# Patient Record
Sex: FEMALE | Race: WHITE | NOT HISPANIC OR LATINO | RURAL
[De-identification: names, ages, dates, MRNs, and addresses within clinical notes are randomized per-mention and may not be internally consistent; named-entity substitution may affect disease eponyms.]

---

## 2022-01-12 ENCOUNTER — OFFICE VISIT (OUTPATIENT)
Dept: RURAL CLINIC 8 | Facility: CLINIC | Age: 67
End: 2022-01-12
Payer: COMMERCIAL

## 2022-01-12 ENCOUNTER — CLAIMS CREATED FROM THE CLAIM WINDOW (OUTPATIENT)
Dept: RURAL CLINIC 8 | Facility: CLINIC | Age: 67
End: 2022-01-12
Payer: COMMERCIAL

## 2022-01-12 DIAGNOSIS — M43.6 BENIGN TORTICOLLIS: ICD-10-CM

## 2022-01-12 DIAGNOSIS — Z86.010 HISTORY OF COLONIC POLYPS: ICD-10-CM

## 2022-01-12 DIAGNOSIS — J44.9 CHRONIC OBSTRUCTIVE PULMONARY DISEASE, UNSPECIFIED COPD TYPE: ICD-10-CM

## 2022-01-12 DIAGNOSIS — K21.9 GERD WITHOUT ESOPHAGITIS: ICD-10-CM

## 2022-01-12 DIAGNOSIS — R13.19 ESOPHAGEAL DYSPHAGIA: ICD-10-CM

## 2022-01-12 DIAGNOSIS — Z72.0 TOBACCO ABUSE: ICD-10-CM

## 2022-01-12 PROBLEM — 13645005: Status: ACTIVE | Noted: 2022-01-12

## 2022-01-12 PROCEDURE — 99204 OFFICE O/P NEW MOD 45 MIN: CPT | Performed by: INTERNAL MEDICINE

## 2022-01-12 RX ORDER — CETIRIZINE HYDROCHLORIDE 1 MG/ML
AS DIRECTED SOLUTION ORAL
Status: ACTIVE | COMMUNITY

## 2022-01-12 RX ORDER — OMADACYCLINE 150 MG/1
2 TABLETS TABLET, FILM COATED ORAL ONCE A DAY
Status: DISCONTINUED | COMMUNITY

## 2022-01-12 RX ORDER — BUPROPION HYDROCHLORIDE 150 MG/1
1 TABLET IN THE MORNING TABLET, EXTENDED RELEASE ORAL ONCE A DAY
Status: DISCONTINUED | COMMUNITY

## 2022-01-12 RX ORDER — SODIUM, POTASSIUM,MAG SULFATES 17.5-3.13G
177 ML SOLUTION, RECONSTITUTED, ORAL ORAL
Qty: 1 KIT | Refills: 0 | OUTPATIENT
Start: 2022-01-12

## 2022-01-12 RX ORDER — FAMOTIDINE 40 MG/1
1 TABLET AT BEDTIME TABLET, FILM COATED ORAL ONCE A DAY
Status: ACTIVE | COMMUNITY

## 2022-01-12 RX ORDER — BISACODYL 5 MG
TAKE 4 TABLET, DELAYED RELEASE (ENTERIC COATED) ORAL
Qty: 4 | OUTPATIENT
Start: 2022-01-12 | End: 2022-01-13

## 2022-01-12 RX ORDER — BUDESONIDE AND FORMOTEROL FUMARATE DIHYDRATE 160; 4.5 UG/1; UG/1
2 PUFFS AEROSOL RESPIRATORY (INHALATION) TWICE A DAY
Status: ACTIVE | COMMUNITY

## 2022-01-12 RX ORDER — ESOMEPRAZOLE MAGNESIUM 40 MG/1
1 PACKET MIXED WITH 15 ML OF WATER FOR SUSPENSION ORAL ONCE A DAY
Qty: 90 | Refills: 3 | OUTPATIENT
Start: 2022-01-12

## 2022-01-12 RX ORDER — BUPRENORPHINE HYDROCHLORIDE, NALOXONE HYDROCHLORIDE 8; 2 MG/1; MG/1
1 FILM UNDER THE TONGUE AND ALLOW TO DISSOLVE FILM, SOLUBLE BUCCAL; SUBLINGUAL ONCE A DAY
Status: ACTIVE | COMMUNITY

## 2022-01-12 RX ORDER — ALBUTEROL SULFATE 90 UG/1
1 PUFF AS NEEDED AEROSOL, METERED RESPIRATORY (INHALATION)
Status: ACTIVE | COMMUNITY

## 2022-01-12 RX ORDER — FAMOTIDINE 20 MG/1
1 TABLET AT BEDTIME AS NEEDED TABLET, FILM COATED ORAL ONCE A DAY
Status: ACTIVE | COMMUNITY

## 2022-01-12 RX ORDER — LOSARTAN POTASSIUM 50 MG/1
1 TABLET TABLET ORAL ONCE A DAY
Status: ACTIVE | COMMUNITY

## 2022-01-12 NOTE — HPI-TODAY'S VISIT:
Pt comes on referral from Dr. Lakesha Escalona. a copy will be sent to the referring MD. Pt says that this problem started about 5 years ago. she started to have problems swallowing. food will just go part of the way down and hang up. she would have to wash it down and keep on eating. it can hang up and stay there all day long. - it feels like liquids go down just fine but solid foods hang up. it feels like it get stuck about midways down.she will have to jjst drink water til it all goes down. - she says that she has lost 65 pounds in the last year. she thinks its b/c she cannot eat enough. she apparently had a swallow evatuaion radiographically but of course it was not sent to me to review.

## 2022-01-18 ENCOUNTER — TELEPHONE ENCOUNTER (OUTPATIENT)
Dept: URBAN - METROPOLITAN AREA CLINIC 92 | Facility: CLINIC | Age: 67
End: 2022-01-18

## 2022-01-21 ENCOUNTER — TELEPHONE ENCOUNTER (OUTPATIENT)
Dept: URBAN - METROPOLITAN AREA CLINIC 92 | Facility: CLINIC | Age: 67
End: 2022-01-21

## 2022-02-25 ENCOUNTER — OFFICE VISIT (OUTPATIENT)
Dept: RURAL MEDICAL CENTER 4 | Facility: MEDICAL CENTER | Age: 67
End: 2022-02-25
Payer: COMMERCIAL

## 2022-02-25 DIAGNOSIS — Z86.010 ADENOMAS PERSONAL HISTORY OF COLONIC POLYPS: ICD-10-CM

## 2022-02-25 DIAGNOSIS — K29.40 ATROPHIC GASTRITIS: ICD-10-CM

## 2022-02-25 DIAGNOSIS — K31.89 ACQUIRED DEFORMITY OF DUODENUM: ICD-10-CM

## 2022-02-25 DIAGNOSIS — K22.89 ESOPHAGEAL BLEED, NON-VARICEAL: ICD-10-CM

## 2022-02-25 DIAGNOSIS — Z53.8 FAILED ATTEMPTED SURGICAL PROCEDURE: ICD-10-CM

## 2022-02-25 DIAGNOSIS — R13.19 CERVICAL DYSPHAGIA: ICD-10-CM

## 2022-02-25 PROCEDURE — G0105 COLORECTAL SCRN; HI RISK IND: HCPCS | Performed by: INTERNAL MEDICINE

## 2022-02-25 PROCEDURE — 43239 EGD BIOPSY SINGLE/MULTIPLE: CPT | Performed by: INTERNAL MEDICINE

## 2022-02-25 PROCEDURE — 43249 ESOPH EGD DILATION <30 MM: CPT | Performed by: INTERNAL MEDICINE

## 2022-04-08 ENCOUNTER — TELEPHONE ENCOUNTER (OUTPATIENT)
Dept: URBAN - METROPOLITAN AREA CLINIC 92 | Facility: CLINIC | Age: 67
End: 2022-04-08

## 2022-06-15 ENCOUNTER — LAB OUTSIDE AN ENCOUNTER (OUTPATIENT)
Dept: RURAL CLINIC 8 | Facility: CLINIC | Age: 67
End: 2022-06-15

## 2022-06-15 PROBLEM — 428283002: Status: ACTIVE | Noted: 2022-01-12

## 2022-06-20 ENCOUNTER — TELEPHONE ENCOUNTER (OUTPATIENT)
Dept: URBAN - METROPOLITAN AREA CLINIC 105 | Facility: CLINIC | Age: 67
End: 2022-06-20

## 2022-06-21 ENCOUNTER — OFFICE VISIT (OUTPATIENT)
Dept: RURAL MEDICAL CENTER 4 | Facility: MEDICAL CENTER | Age: 67
End: 2022-06-21

## 2022-06-22 ENCOUNTER — OFFICE VISIT (OUTPATIENT)
Dept: RURAL CLINIC 8 | Facility: CLINIC | Age: 67
End: 2022-06-22
Payer: COMMERCIAL

## 2022-06-22 VITALS
HEART RATE: 85 BPM | HEIGHT: 62 IN | SYSTOLIC BLOOD PRESSURE: 132 MMHG | TEMPERATURE: 98.4 F | WEIGHT: 150 LBS | BODY MASS INDEX: 27.6 KG/M2 | DIASTOLIC BLOOD PRESSURE: 62 MMHG

## 2022-06-22 DIAGNOSIS — R10.13 EPIGASTRIC ABDOMINAL PAIN: ICD-10-CM

## 2022-06-22 DIAGNOSIS — R11.14 BILIOUS VOMITING WITH NAUSEA: ICD-10-CM

## 2022-06-22 DIAGNOSIS — R93.5 ABNORMAL CT OF THE ABDOMEN: ICD-10-CM

## 2022-06-22 DIAGNOSIS — K59.09 CHRONIC CONSTIPATION: ICD-10-CM

## 2022-06-22 DIAGNOSIS — K21.9 GERD WITHOUT ESOPHAGITIS: ICD-10-CM

## 2022-06-22 PROCEDURE — 99214 OFFICE O/P EST MOD 30 MIN: CPT | Performed by: INTERNAL MEDICINE

## 2022-06-22 RX ORDER — HYOSCYAMINE SULFATE 0.12 MG/1
1 TABLET TABLET SUBLINGUAL
Qty: 60 | Refills: 1 | OUTPATIENT
Start: 2022-06-22 | End: 2022-08-21

## 2022-06-22 RX ORDER — LOSARTAN POTASSIUM 50 MG/1
1 TABLET TABLET ORAL ONCE A DAY
Status: ACTIVE | COMMUNITY

## 2022-06-22 RX ORDER — FAMOTIDINE 40 MG/1
1 TABLET AT BEDTIME TABLET, FILM COATED ORAL ONCE A DAY
Status: ACTIVE | COMMUNITY

## 2022-06-22 RX ORDER — ESOMEPRAZOLE MAGNESIUM 40 MG/1
1 PACKET MIXED WITH 15 ML OF WATER FOR SUSPENSION ORAL ONCE A DAY
Qty: 90 | Refills: 3 | Status: DISCONTINUED | COMMUNITY
Start: 2022-01-12

## 2022-06-22 RX ORDER — BUDESONIDE AND FORMOTEROL FUMARATE DIHYDRATE 160; 4.5 UG/1; UG/1
2 PUFFS AEROSOL RESPIRATORY (INHALATION) TWICE A DAY
Status: ACTIVE | COMMUNITY

## 2022-06-22 RX ORDER — ALBUTEROL SULFATE 90 UG/1
1 PUFF AS NEEDED AEROSOL, METERED RESPIRATORY (INHALATION)
Status: ACTIVE | COMMUNITY

## 2022-06-22 RX ORDER — SODIUM, POTASSIUM,MAG SULFATES 17.5-3.13G
177 ML SOLUTION, RECONSTITUTED, ORAL ORAL
Qty: 1 KIT | Refills: 0 | Status: DISCONTINUED | COMMUNITY
Start: 2022-01-12

## 2022-06-22 RX ORDER — BUPRENORPHINE HYDROCHLORIDE, NALOXONE HYDROCHLORIDE 8; 2 MG/1; MG/1
1 FILM UNDER THE TONGUE AND ALLOW TO DISSOLVE FILM, SOLUBLE BUCCAL; SUBLINGUAL ONCE A DAY
Status: ACTIVE | COMMUNITY

## 2022-06-22 RX ORDER — FAMOTIDINE 20 MG/1
1 TABLET AT BEDTIME AS NEEDED TABLET, FILM COATED ORAL ONCE A DAY
Status: ACTIVE | COMMUNITY

## 2022-06-22 RX ORDER — CETIRIZINE HYDROCHLORIDE 1 MG/ML
AS DIRECTED SOLUTION ORAL
Status: ACTIVE | COMMUNITY

## 2022-06-22 NOTE — HPI-TODAY'S VISIT:
Pt comes on referral from Dr. Lakesha Escalona. a copy will be sent to the referring MD. Pt says that this problem started about 5 years ago. she started to have problems swallowing. food will just go part of the way down and hang up. she would have to wash it down and keep on eating. it can hang up and stay there all day long. - it feels like liquids go down just fine but solid foods hang up. it feels like it get stuck about midways down.she will have to jjst drink water til it all goes down. - she says that she has lost 65 pounds in the last year. she thinks its b/c she cannot eat enough. she apparently had a swallow evatuaion radiographically but of course it was not sent to me to review. -   06/22/2022: She was on the schedule for Repeat colonoscopy yesterday but she did not come to the appointment.  Recent CT scan showed some chronic thickening of the sigmoid colon. -   In February 2022 I performed this patient's EGD secondary to dysphagia or and I empirically dilated the esophagus.  Antral biopsies were negative for Helicobacter.  The colonoscopy was aborted secondary to formed stool in the colon. -  she is only on famotidine once a day and still having acid reflux.  She says that she get spontaneous regurgitation of acid.  She also says that sometimes food does not want to go down all the way.  She has only been able to eat liquid things.  She was unable to keep the prep down in order to have her colonoscopy performed.

## 2022-08-15 ENCOUNTER — TELEPHONE ENCOUNTER (OUTPATIENT)
Dept: URBAN - METROPOLITAN AREA CLINIC 92 | Facility: CLINIC | Age: 67
End: 2022-08-15

## 2022-09-13 ENCOUNTER — TELEPHONE ENCOUNTER (OUTPATIENT)
Dept: URBAN - METROPOLITAN AREA CLINIC 105 | Facility: CLINIC | Age: 67
End: 2022-09-13

## 2022-09-27 ENCOUNTER — TELEPHONE ENCOUNTER (OUTPATIENT)
Dept: URBAN - METROPOLITAN AREA CLINIC 92 | Facility: CLINIC | Age: 67
End: 2022-09-27

## 2022-10-12 ENCOUNTER — OFFICE VISIT (OUTPATIENT)
Dept: RURAL CLINIC 8 | Facility: CLINIC | Age: 67
End: 2022-10-12
Payer: COMMERCIAL

## 2022-10-12 ENCOUNTER — TELEPHONE ENCOUNTER (OUTPATIENT)
Dept: URBAN - METROPOLITAN AREA CLINIC 92 | Facility: CLINIC | Age: 67
End: 2022-10-12

## 2022-10-12 VITALS
WEIGHT: 150 LBS | SYSTOLIC BLOOD PRESSURE: 142 MMHG | HEIGHT: 62 IN | HEART RATE: 79 BPM | DIASTOLIC BLOOD PRESSURE: 66 MMHG | TEMPERATURE: 98.4 F | BODY MASS INDEX: 27.6 KG/M2

## 2022-10-12 DIAGNOSIS — K21.9 GERD WITHOUT ESOPHAGITIS: ICD-10-CM

## 2022-10-12 DIAGNOSIS — K59.09 CHRONIC CONSTIPATION: ICD-10-CM

## 2022-10-12 DIAGNOSIS — M54.9 DORSALGIA, UNSPECIFIED: ICD-10-CM

## 2022-10-12 DIAGNOSIS — G89.29 OTHER CHRONIC PAIN: ICD-10-CM

## 2022-10-12 DIAGNOSIS — R12 HEARTBURN: ICD-10-CM

## 2022-10-12 PROBLEM — 266435005: Status: ACTIVE | Noted: 2022-01-12

## 2022-10-12 PROBLEM — 82423001: Status: ACTIVE | Noted: 2022-10-12

## 2022-10-12 PROCEDURE — 99214 OFFICE O/P EST MOD 30 MIN: CPT | Performed by: INTERNAL MEDICINE

## 2022-10-12 RX ORDER — ALBUTEROL SULFATE 90 UG/1
1 PUFF AS NEEDED AEROSOL, METERED RESPIRATORY (INHALATION)
Status: ACTIVE | COMMUNITY

## 2022-10-12 RX ORDER — LOSARTAN POTASSIUM 50 MG/1
1 TABLET TABLET ORAL ONCE A DAY
Status: ACTIVE | COMMUNITY

## 2022-10-12 RX ORDER — BUPRENORPHINE HYDROCHLORIDE, NALOXONE HYDROCHLORIDE 8; 2 MG/1; MG/1
1 FILM UNDER THE TONGUE AND ALLOW TO DISSOLVE FILM, SOLUBLE BUCCAL; SUBLINGUAL ONCE A DAY
Status: ACTIVE | COMMUNITY

## 2022-10-12 RX ORDER — FAMOTIDINE 40 MG/1
1 TABLET AT BEDTIME TABLET, FILM COATED ORAL ONCE A DAY
Status: ACTIVE | COMMUNITY

## 2022-10-12 RX ORDER — FAMOTIDINE 20 MG/1
1 TABLET AT BEDTIME AS NEEDED TABLET, FILM COATED ORAL ONCE A DAY
Status: ACTIVE | COMMUNITY

## 2022-10-12 RX ORDER — NALOXEGOL OXALATE 25 MG/1
1 TABLET IN THE MORNING TABLET, FILM COATED ORAL ONCE A DAY
Qty: 30 | OUTPATIENT
Start: 2022-10-12 | End: 2022-11-10

## 2022-10-12 RX ORDER — CETIRIZINE HYDROCHLORIDE 1 MG/ML
AS DIRECTED SOLUTION ORAL
Status: ACTIVE | COMMUNITY

## 2022-10-12 RX ORDER — PANTOPRAZOLE SODIUM 40 MG/1
1 TABLET TABLET, DELAYED RELEASE ORAL ONCE A DAY
Qty: 90 TABLET | Refills: 3 | OUTPATIENT
Start: 2022-10-12

## 2022-10-12 RX ORDER — BUDESONIDE AND FORMOTEROL FUMARATE DIHYDRATE 160; 4.5 UG/1; UG/1
2 PUFFS AEROSOL RESPIRATORY (INHALATION) TWICE A DAY
Status: ACTIVE | COMMUNITY

## 2022-10-12 NOTE — HPI-TODAY'S VISIT:
Pt comes on referral from Dr. Lakesha Escalona. a copy will be sent to the referring MD. Pt says that this problem started about 5 years ago. she started to have problems swallowing. food will just go part of the way down and hang up. she would have to wash it down and keep on eating. it can hang up and stay there all day long. - it feels like liquids go down just fine but solid foods hang up. it feels like it get stuck about midways down.she will have to jjst drink water til it all goes down. - she says that she has lost 65 pounds in the last year. she thinks its b/c she cannot eat enough. she apparently had a swallow evatuaion radiographically but of course it was not sent to me to review. -   06/22/2022: She was on the schedule for Repeat colonoscopy yesterday but she did not come to the appointment.  Recent CT scan showed some chronic thickening of the sigmoid colon. -   In February 2022 I performed this patient's EGD secondary to dysphagia or and I empirically dilated the esophagus.  Antral biopsies were negative for Helicobacter.  The colonoscopy was aborted secondary to formed stool in the colon. -  she is only on famotidine once a day and still having acid reflux.  She says that she get spontaneous regurgitation of acid.  She also says that sometimes food does not want to go down all the way.  She has only been able to eat liquid things.  She was unable to keep the prep down in order to have her colonoscopy performed. -  10/12/2022: At her last visit I requested an upper GI series that did show acid reflux.  There was slight delay in the contrast passing through the small bowel.  Patient could not hold down the prep to re-attempt her colonoscopy after the 1 in February had to be aborted due to her poor bowel prep.  We also gave her some Levsin last time to try.  She says that she is having bad acid reflux and she is bed constipated.  Takes Suboxone 3 times a day.  She is having lower cramping pain in the pelvis and lower abdomen.

## 2022-12-20 ENCOUNTER — TELEPHONE ENCOUNTER (OUTPATIENT)
Dept: URBAN - METROPOLITAN AREA CLINIC 92 | Facility: CLINIC | Age: 67
End: 2022-12-20

## 2022-12-27 ENCOUNTER — OFFICE VISIT (OUTPATIENT)
Dept: RURAL CLINIC 4 | Facility: CLINIC | Age: 67
End: 2022-12-27

## 2022-12-27 RX ORDER — BUPRENORPHINE HYDROCHLORIDE, NALOXONE HYDROCHLORIDE 8; 2 MG/1; MG/1
1 FILM UNDER THE TONGUE AND ALLOW TO DISSOLVE FILM, SOLUBLE BUCCAL; SUBLINGUAL ONCE A DAY
Status: ACTIVE | COMMUNITY

## 2022-12-27 RX ORDER — FAMOTIDINE 20 MG/1
1 TABLET AT BEDTIME AS NEEDED TABLET, FILM COATED ORAL ONCE A DAY
Status: ACTIVE | COMMUNITY

## 2022-12-27 RX ORDER — PANTOPRAZOLE SODIUM 40 MG/1
1 TABLET TABLET, DELAYED RELEASE ORAL ONCE A DAY
Qty: 90 TABLET | Refills: 3 | Status: ACTIVE | COMMUNITY
Start: 2022-10-12

## 2022-12-27 RX ORDER — BUDESONIDE AND FORMOTEROL FUMARATE DIHYDRATE 160; 4.5 UG/1; UG/1
2 PUFFS AEROSOL RESPIRATORY (INHALATION) TWICE A DAY
Status: ACTIVE | COMMUNITY

## 2022-12-27 RX ORDER — FAMOTIDINE 40 MG/1
1 TABLET AT BEDTIME TABLET, FILM COATED ORAL ONCE A DAY
Status: ACTIVE | COMMUNITY

## 2022-12-27 RX ORDER — LOSARTAN POTASSIUM 50 MG/1
1 TABLET TABLET ORAL ONCE A DAY
Status: ACTIVE | COMMUNITY

## 2022-12-27 RX ORDER — CETIRIZINE HYDROCHLORIDE 1 MG/ML
AS DIRECTED SOLUTION ORAL
Status: ACTIVE | COMMUNITY

## 2022-12-27 RX ORDER — ALBUTEROL SULFATE 90 UG/1
1 PUFF AS NEEDED AEROSOL, METERED RESPIRATORY (INHALATION)
Status: ACTIVE | COMMUNITY

## 2023-01-05 ENCOUNTER — OFFICE VISIT (OUTPATIENT)
Dept: RURAL CLINIC 4 | Facility: CLINIC | Age: 68
End: 2023-01-05

## 2023-01-05 RX ORDER — FAMOTIDINE 20 MG/1
1 TABLET AT BEDTIME AS NEEDED TABLET, FILM COATED ORAL ONCE A DAY
COMMUNITY

## 2023-01-05 RX ORDER — BUPRENORPHINE HYDROCHLORIDE, NALOXONE HYDROCHLORIDE 8; 2 MG/1; MG/1
1 FILM UNDER THE TONGUE AND ALLOW TO DISSOLVE FILM, SOLUBLE BUCCAL; SUBLINGUAL ONCE A DAY
COMMUNITY

## 2023-01-05 RX ORDER — CETIRIZINE HYDROCHLORIDE 1 MG/ML
AS DIRECTED SOLUTION ORAL
COMMUNITY

## 2023-01-05 RX ORDER — ALBUTEROL SULFATE 90 UG/1
1 PUFF AS NEEDED AEROSOL, METERED RESPIRATORY (INHALATION)
COMMUNITY

## 2023-01-05 RX ORDER — BUDESONIDE AND FORMOTEROL FUMARATE DIHYDRATE 160; 4.5 UG/1; UG/1
2 PUFFS AEROSOL RESPIRATORY (INHALATION) TWICE A DAY
COMMUNITY

## 2023-01-05 RX ORDER — PANTOPRAZOLE SODIUM 40 MG/1
1 TABLET TABLET, DELAYED RELEASE ORAL ONCE A DAY
Qty: 90 TABLET | Refills: 3 | COMMUNITY
Start: 2022-10-12

## 2023-01-05 RX ORDER — FAMOTIDINE 40 MG/1
1 TABLET AT BEDTIME TABLET, FILM COATED ORAL ONCE A DAY
COMMUNITY

## 2023-01-05 RX ORDER — LOSARTAN POTASSIUM 50 MG/1
1 TABLET TABLET ORAL ONCE A DAY
COMMUNITY

## 2023-01-12 ENCOUNTER — CLAIMS CREATED FROM THE CLAIM WINDOW (OUTPATIENT)
Dept: RURAL CLINIC 4 | Facility: CLINIC | Age: 68
End: 2023-01-12
Payer: COMMERCIAL

## 2023-01-12 ENCOUNTER — OFFICE VISIT (OUTPATIENT)
Dept: RURAL CLINIC 4 | Facility: CLINIC | Age: 68
End: 2023-01-12
Payer: COMMERCIAL

## 2023-01-12 VITALS
TEMPERATURE: 98 F | DIASTOLIC BLOOD PRESSURE: 79 MMHG | BODY MASS INDEX: 27.6 KG/M2 | SYSTOLIC BLOOD PRESSURE: 152 MMHG | HEIGHT: 62 IN | HEART RATE: 85 BPM | WEIGHT: 150 LBS

## 2023-01-12 DIAGNOSIS — R10.32 LEFT LOWER QUADRANT PAIN: ICD-10-CM

## 2023-01-12 DIAGNOSIS — R10.31 RIGHT LOWER QUADRANT PAIN: ICD-10-CM

## 2023-01-12 DIAGNOSIS — K59.04 CHRONIC IDIOPATHIC CONSTIPATION: ICD-10-CM

## 2023-01-12 DIAGNOSIS — Z86.010 HISTORY OF COLON POLYPS: ICD-10-CM

## 2023-01-12 PROBLEM — 54586004: Status: ACTIVE | Noted: 2023-01-12

## 2023-01-12 PROBLEM — 301716002: Status: ACTIVE | Noted: 2023-01-12

## 2023-01-12 PROBLEM — 82934008: Status: ACTIVE | Noted: 2023-01-12

## 2023-01-12 PROBLEM — 116289008: Status: ACTIVE | Noted: 2023-01-12

## 2023-01-12 PROBLEM — 266433003: Status: ACTIVE | Noted: 2023-01-12

## 2023-01-12 PROBLEM — 62315008: Status: ACTIVE | Noted: 2023-01-12

## 2023-01-12 PROCEDURE — 99214 OFFICE O/P EST MOD 30 MIN: CPT | Performed by: INTERNAL MEDICINE

## 2023-01-12 RX ORDER — PANTOPRAZOLE SODIUM 40 MG/1
1 TABLET TABLET, DELAYED RELEASE ORAL ONCE A DAY
Qty: 90 TABLET | Refills: 3 | Status: DISCONTINUED | COMMUNITY
Start: 2022-10-12

## 2023-01-12 RX ORDER — CETIRIZINE HYDROCHLORIDE 1 MG/ML
AS DIRECTED SOLUTION ORAL
Status: DISCONTINUED | COMMUNITY

## 2023-01-12 RX ORDER — FAMOTIDINE 20 MG/1
1 TABLET AT BEDTIME AS NEEDED TABLET, FILM COATED ORAL ONCE A DAY
Status: ACTIVE | COMMUNITY

## 2023-01-12 RX ORDER — ALBUTEROL SULFATE 90 UG/1
1 PUFF AS NEEDED AEROSOL, METERED RESPIRATORY (INHALATION)
Status: ACTIVE | COMMUNITY

## 2023-01-12 RX ORDER — BUDESONIDE AND FORMOTEROL FUMARATE DIHYDRATE 160; 4.5 UG/1; UG/1
2 PUFFS AEROSOL RESPIRATORY (INHALATION) TWICE A DAY
Status: ACTIVE | COMMUNITY

## 2023-01-12 RX ORDER — BUPRENORPHINE HYDROCHLORIDE, NALOXONE HYDROCHLORIDE 8; 2 MG/1; MG/1
1 FILM UNDER THE TONGUE AND ALLOW TO DISSOLVE FILM, SOLUBLE BUCCAL; SUBLINGUAL ONCE A DAY
Status: ACTIVE | COMMUNITY

## 2023-01-12 RX ORDER — FAMOTIDINE 40 MG/1
1 TABLET AT BEDTIME TABLET, FILM COATED ORAL ONCE A DAY
Status: DISCONTINUED | COMMUNITY

## 2023-01-12 RX ORDER — LOSARTAN POTASSIUM 50 MG/1
1 TABLET TABLET ORAL ONCE A DAY
Status: ACTIVE | COMMUNITY

## 2023-01-12 RX ORDER — NALOXEGOL OXALATE 25 MG/1
1 TABLET IN THE MORNING TABLET, FILM COATED ORAL ONCE A DAY
Qty: 30 | Refills: 3 | OUTPATIENT
Start: 2023-01-12 | End: 2023-05-12

## 2023-01-12 RX ORDER — PANTOPRAZOLE SODIUM 40 MG/1
1 PACKET MIXED WITH APPLE JUICE OR APPLESAUCE GRANULE, DELAYED RELEASE ORAL ONCE A DAY
Qty: 30 | Refills: 6 | OUTPATIENT
Start: 2023-01-12

## 2023-01-12 NOTE — HPI-TODAY'S VISIT:
Pt comes on referral from Dr. Lakesha Escalona. a copy will be sent to the referring MD. Pt says that this problem started about 5 years ago. she started to have problems swallowing. food will just go part of the way down and hang up. she would have to wash it down and keep on eating. it can hang up and stay there all day long. - it feels like liquids go down just fine but solid foods hang up. it feels like it get stuck about midways down.she will have to jjst drink water til it all goes down. - she says that she has lost 65 pounds in the last year. she thinks its b/c she cannot eat enough. she apparently had a swallow evatuaion radiographically but of course it was not sent to me to review. -   06/22/2022: She was on the schedule for Repeat colonoscopy yesterday but she did not come to the appointment.  Recent CT scan showed some chronic thickening of the sigmoid colon. -   In February 2022 I performed this patient's EGD secondary to dysphagia or and I empirically dilated the esophagus.  Antral biopsies were negative for Helicobacter.  The colonoscopy was aborted secondary to formed stool in the colon. -  she is only on famotidine once a day and still having acid reflux.  She says that she get spontaneous regurgitation of acid.  She also says that sometimes food does not want to go down all the way.  She has only been able to eat liquid things.  She was unable to keep the prep down in order to have her colonoscopy performed. -  10/12/2022: At her last visit I requested an upper GI series that did show acid reflux.  There was slight delay in the contrast passing through the small bowel.  Patient could not hold down the prep to re-attempt her colonoscopy after the 1 in February had to be aborted due to her poor bowel prep.  We also gave her some Levsin last time to try.  She says that she is having bad acid reflux and she is bed constipated.  Takes Suboxone 3 times a day.  She is having lower cramping pain in the pelvis and lower abdomen. 01/12/2023 The patient reports worsening acid reflux since stopping Nexium due to side effects.  Lower abdominal cramping and constipation has worsened and resulting in abdominal bloating.  She was seen at [facility] emergency room approximately on December 18, 2022 and was given dicyclomine which is helping.  A CT scan of the abdomen and pelvis was completed showing mild colonic diverticular reticulosis without evidence of diverticulitis was seen.  Mild intrahepatic biliary ductal dilation was noted as well as postcholecystectomy changes.  No acute abdominal or pelvic pathology was noted.

## 2023-03-08 ENCOUNTER — OFFICE VISIT (OUTPATIENT)
Dept: RURAL CLINIC 8 | Facility: CLINIC | Age: 68
End: 2023-03-08

## 2023-03-08 RX ORDER — ALBUTEROL SULFATE 90 UG/1
1 PUFF AS NEEDED AEROSOL, METERED RESPIRATORY (INHALATION)
Status: ACTIVE | COMMUNITY

## 2023-03-08 RX ORDER — LOSARTAN POTASSIUM 50 MG/1
1 TABLET TABLET ORAL ONCE A DAY
Status: ACTIVE | COMMUNITY

## 2023-03-08 RX ORDER — BUPRENORPHINE HYDROCHLORIDE, NALOXONE HYDROCHLORIDE 8; 2 MG/1; MG/1
1 FILM UNDER THE TONGUE AND ALLOW TO DISSOLVE FILM, SOLUBLE BUCCAL; SUBLINGUAL ONCE A DAY
Status: ACTIVE | COMMUNITY

## 2023-03-08 RX ORDER — BUDESONIDE AND FORMOTEROL FUMARATE DIHYDRATE 160; 4.5 UG/1; UG/1
2 PUFFS AEROSOL RESPIRATORY (INHALATION) TWICE A DAY
Status: ACTIVE | COMMUNITY

## 2023-03-08 RX ORDER — NALOXEGOL OXALATE 25 MG/1
1 TABLET IN THE MORNING TABLET, FILM COATED ORAL ONCE A DAY
Qty: 30 | Refills: 3 | Status: ACTIVE | COMMUNITY
Start: 2023-01-12 | End: 2023-05-12

## 2023-03-08 RX ORDER — PANTOPRAZOLE SODIUM 40 MG/1
1 PACKET MIXED WITH APPLE JUICE OR APPLESAUCE GRANULE, DELAYED RELEASE ORAL ONCE A DAY
Qty: 30 | Refills: 6 | Status: ACTIVE | COMMUNITY
Start: 2023-01-12

## 2023-03-08 RX ORDER — FAMOTIDINE 20 MG/1
1 TABLET AT BEDTIME AS NEEDED TABLET, FILM COATED ORAL ONCE A DAY
Status: ACTIVE | COMMUNITY

## 2023-03-08 NOTE — HPI-TODAY'S VISIT:
Pt comes on referral from Dr. Lakesha Escalona. a copy will be sent to the referring MD. Pt says that this problem started about 5 years ago. she started to have problems swallowing. food will just go part of the way down and hang up. she would have to wash it down and keep on eating. it can hang up and stay there all day long. - it feels like liquids go down just fine but solid foods hang up. it feels like it get stuck about midways down.she will have to jjst drink water til it all goes down. - she says that she has lost 65 pounds in the last year. she thinks its b/c she cannot eat enough. she apparently had a swallow evatuaion radiographically but of course it was not sent to me to review. -   06/22/2022: She was on the schedule for Repeat colonoscopy yesterday but she did not come to the appointment.  Recent CT scan showed some chronic thickening of the sigmoid colon. -   In February 2022 I performed this patient's EGD secondary to dysphagia or and I empirically dilated the esophagus.  Antral biopsies were negative for Helicobacter.  The colonoscopy was aborted secondary to formed stool in the colon. -  she is only on famotidine once a day and still having acid reflux.  She says that she get spontaneous regurgitation of acid.  She also says that sometimes food does not want to go down all the way.  She has only been able to eat liquid things.  She was unable to keep the prep down in order to have her colonoscopy performed. -  10/12/2022: At her last visit I requested an upper GI series that did show acid reflux.  There was slight delay in the contrast passing through the small bowel.  Patient could not hold down the prep to re-attempt her colonoscopy after the 1 in February had to be aborted due to her poor bowel prep.  We also gave her some Levsin last time to try.  She says that she is having bad acid reflux and she is bed constipated.  Takes Suboxone 3 times a day.  She is having lower cramping pain in the pelvis and lower abdomen. 01/12/2023 The patient reports worsening acid reflux since stopping Nexium due to side effects.  Lower abdominal cramping and constipation has worsened and resulting in abdominal bloating.  She was seen at [facility] emergency room approximately on December 18, 2022 and was given dicyclomine which is helping.  A CT scan of the abdomen and pelvis was completed showing mild colonic diverticular reticulosis without evidence of diverticulitis was seen.  Mild intrahepatic biliary ductal dilation was noted as well as postcholecystectomy changes.  No acute abdominal or pelvic pathology was noted. -  03/08/2023:  Patient was recently started on Movantik and pantoprazole powder

## 2023-03-31 ENCOUNTER — OFFICE VISIT (OUTPATIENT)
Dept: RURAL CLINIC 2 | Facility: CLINIC | Age: 68
End: 2023-03-31

## 2023-03-31 RX ORDER — ALBUTEROL SULFATE 90 UG/1
1 PUFF AS NEEDED AEROSOL, METERED RESPIRATORY (INHALATION)
COMMUNITY

## 2023-03-31 RX ORDER — NALOXEGOL OXALATE 25 MG/1
1 TABLET IN THE MORNING TABLET, FILM COATED ORAL ONCE A DAY
Qty: 30 | Refills: 3 | COMMUNITY
Start: 2023-01-12 | End: 2023-05-12

## 2023-03-31 RX ORDER — BUPRENORPHINE HYDROCHLORIDE, NALOXONE HYDROCHLORIDE 8; 2 MG/1; MG/1
1 FILM UNDER THE TONGUE AND ALLOW TO DISSOLVE FILM, SOLUBLE BUCCAL; SUBLINGUAL ONCE A DAY
COMMUNITY

## 2023-03-31 RX ORDER — BUDESONIDE AND FORMOTEROL FUMARATE DIHYDRATE 160; 4.5 UG/1; UG/1
2 PUFFS AEROSOL RESPIRATORY (INHALATION) TWICE A DAY
COMMUNITY

## 2023-03-31 RX ORDER — LOSARTAN POTASSIUM 50 MG/1
1 TABLET TABLET ORAL ONCE A DAY
COMMUNITY

## 2023-03-31 RX ORDER — FAMOTIDINE 20 MG/1
1 TABLET AT BEDTIME AS NEEDED TABLET, FILM COATED ORAL ONCE A DAY
COMMUNITY

## 2023-03-31 RX ORDER — PANTOPRAZOLE SODIUM 40 MG/1
1 PACKET MIXED WITH APPLE JUICE OR APPLESAUCE GRANULE, DELAYED RELEASE ORAL ONCE A DAY
Qty: 30 | Refills: 6 | COMMUNITY
Start: 2023-01-12

## 2023-03-31 NOTE — HPI-TODAY'S VISIT:
Pt comes on referral from Dr. Lakesha Escalona. a copy will be sent to the referring MD. Pt says that this problem started about 5 years ago. she started to have problems swallowing. food will just go part of the way down and hang up. she would have to wash it down and keep on eating. it can hang up and stay there all day long. - it feels like liquids go down just fine but solid foods hang up. it feels like it get stuck about midways down.she will have to jjst drink water til it all goes down. - she says that she has lost 65 pounds in the last year. she thinks its b/c she cannot eat enough. she apparently had a swallow evatuaion radiographically but of course it was not sent to me to review. -   06/22/2022: She was on the schedule for Repeat colonoscopy yesterday but she did not come to the appointment.  Recent CT scan showed some chronic thickening of the sigmoid colon. -   In February 2022 I performed this patient's EGD secondary to dysphagia or and I empirically dilated the esophagus.  Antral biopsies were negative for Helicobacter.  The colonoscopy was aborted secondary to formed stool in the colon. -  she is only on famotidine once a day and still having acid reflux.  She says that she get spontaneous regurgitation of acid.  She also says that sometimes food does not want to go down all the way.  She has only been able to eat liquid things.  She was unable to keep the prep down in order to have her colonoscopy performed. -  10/12/2022: At her last visit I requested an upper GI series that did show acid reflux.  There was slight delay in the contrast passing through the small bowel.  Patient could not hold down the prep to re-attempt her colonoscopy after the 1 in February had to be aborted due to her poor bowel prep.  We also gave her some Levsin last time to try.  She says that she is having bad acid reflux and she is bed constipated.  Takes Suboxone 3 times a day.  She is having lower cramping pain in the pelvis and lower abdomen. 01/12/2023 The patient reports worsening acid reflux since stopping Nexium due to side effects.  Lower abdominal cramping and constipation has worsened and resulting in abdominal bloating.  She was seen at [facility] emergency room approximately on December 18, 2022 and was given dicyclomine which is helping.  A CT scan of the abdomen and pelvis was completed showing mild colonic diverticular reticulosis without evidence of diverticulitis was seen.  Mild intrahepatic biliary ductal dilation was noted as well as postcholecystectomy changes.  No acute abdominal or pelvic pathology was noted. -  03/08/2023:  Patient was recently started on Movantik and pantoprazole powder

## 2023-04-14 ENCOUNTER — OFFICE VISIT (OUTPATIENT)
Dept: RURAL CLINIC 8 | Facility: CLINIC | Age: 68
End: 2023-04-14

## 2023-04-14 RX ORDER — FAMOTIDINE 20 MG/1
1 TABLET AT BEDTIME AS NEEDED TABLET, FILM COATED ORAL ONCE A DAY
COMMUNITY

## 2023-04-14 RX ORDER — PANTOPRAZOLE SODIUM 40 MG/1
1 PACKET MIXED WITH APPLE JUICE OR APPLESAUCE GRANULE, DELAYED RELEASE ORAL ONCE A DAY
Qty: 30 | Refills: 6 | COMMUNITY
Start: 2023-01-12

## 2023-04-14 RX ORDER — BUPRENORPHINE HYDROCHLORIDE, NALOXONE HYDROCHLORIDE 8; 2 MG/1; MG/1
1 FILM UNDER THE TONGUE AND ALLOW TO DISSOLVE FILM, SOLUBLE BUCCAL; SUBLINGUAL ONCE A DAY
COMMUNITY

## 2023-04-14 RX ORDER — ALBUTEROL SULFATE 90 UG/1
1 PUFF AS NEEDED AEROSOL, METERED RESPIRATORY (INHALATION)
COMMUNITY

## 2023-04-14 RX ORDER — NALOXEGOL OXALATE 25 MG/1
1 TABLET IN THE MORNING TABLET, FILM COATED ORAL ONCE A DAY
Qty: 30 | Refills: 3 | COMMUNITY
Start: 2023-01-12 | End: 2023-05-12

## 2023-04-14 RX ORDER — BUDESONIDE AND FORMOTEROL FUMARATE DIHYDRATE 160; 4.5 UG/1; UG/1
2 PUFFS AEROSOL RESPIRATORY (INHALATION) TWICE A DAY
COMMUNITY

## 2023-04-14 RX ORDER — LOSARTAN POTASSIUM 50 MG/1
1 TABLET TABLET ORAL ONCE A DAY
COMMUNITY

## 2023-04-14 NOTE — HPI-TODAY'S VISIT:
Pt comes on referral from Dr. Lakesha Escalona. a copy will be sent to the referring MD. Pt says that this problem started about 5 years ago. she started to have problems swallowing. food will just go part of the way down and hang up. she would have to wash it down and keep on eating. it can hang up and stay there all day long. - it feels like liquids go down just fine but solid foods hang up. it feels like it get stuck about midways down.she will have to jjst drink water til it all goes down. - she says that she has lost 65 pounds in the last year. she thinks its b/c she cannot eat enough. she apparently had a swallow evatuaion radiographically but of course it was not sent to me to review. -   06/22/2022: She was on the schedule for Repeat colonoscopy yesterday but she did not come to the appointment.  Recent CT scan showed some chronic thickening of the sigmoid colon. -   In February 2022 I performed this patient's EGD secondary to dysphagia or and I empirically dilated the esophagus.  Antral biopsies were negative for Helicobacter.  The colonoscopy was aborted secondary to formed stool in the colon. -  she is only on famotidine once a day and still having acid reflux.  She says that she get spontaneous regurgitation of acid.  She also says that sometimes food does not want to go down all the way.  She has only been able to eat liquid things.  She was unable to keep the prep down in order to have her colonoscopy performed. -  10/12/2022: At her last visit I requested an upper GI series that did show acid reflux.  There was slight delay in the contrast passing through the small bowel.  Patient could not hold down the prep to re-attempt her colonoscopy after the 1 in February had to be aborted due to her poor bowel prep.  We also gave her some Levsin last time to try.  She says that she is having bad acid reflux and she is bed constipated.  Takes Suboxone 3 times a day.  She is having lower cramping pain in the pelvis and lower abdomen. 01/12/2023 The patient reports worsening acid reflux since stopping Nexium due to side effects.  Lower abdominal cramping and constipation has worsened and resulting in abdominal bloating.  She was seen at [facility] emergency room approximately on December 18, 2022 and was given dicyclomine which is helping.  A CT scan of the abdomen and pelvis was completed showing mild colonic diverticular reticulosis without evidence of diverticulitis was seen.  Mild intrahepatic biliary ductal dilation was noted as well as postcholecystectomy changes.  No acute abdominal or pelvic pathology was noted. -  03/08/2023:  Patient was recently started on Movantik and pantoprazole powder

## 2023-06-28 ENCOUNTER — OFFICE VISIT (OUTPATIENT)
Dept: RURAL CLINIC 8 | Facility: CLINIC | Age: 68
End: 2023-06-28

## 2023-06-28 RX ORDER — LOSARTAN POTASSIUM 50 MG/1
1 TABLET TABLET ORAL ONCE A DAY
COMMUNITY

## 2023-06-28 RX ORDER — PANTOPRAZOLE SODIUM 40 MG/1
1 PACKET MIXED WITH APPLE JUICE OR APPLESAUCE GRANULE, DELAYED RELEASE ORAL ONCE A DAY
Qty: 30 | Refills: 6 | COMMUNITY
Start: 2023-01-12

## 2023-06-28 RX ORDER — BUDESONIDE AND FORMOTEROL FUMARATE DIHYDRATE 160; 4.5 UG/1; UG/1
2 PUFFS AEROSOL RESPIRATORY (INHALATION) TWICE A DAY
COMMUNITY

## 2023-06-28 RX ORDER — FAMOTIDINE 20 MG/1
1 TABLET AT BEDTIME AS NEEDED TABLET, FILM COATED ORAL ONCE A DAY
COMMUNITY

## 2023-06-28 RX ORDER — BUPRENORPHINE HYDROCHLORIDE, NALOXONE HYDROCHLORIDE 8; 2 MG/1; MG/1
1 FILM UNDER THE TONGUE AND ALLOW TO DISSOLVE FILM, SOLUBLE BUCCAL; SUBLINGUAL ONCE A DAY
COMMUNITY

## 2023-06-28 RX ORDER — ALBUTEROL SULFATE 90 UG/1
1 PUFF AS NEEDED AEROSOL, METERED RESPIRATORY (INHALATION)
COMMUNITY

## 2023-10-15 ENCOUNTER — ERX REFILL RESPONSE (OUTPATIENT)
Dept: RURAL CLINIC 8 | Facility: CLINIC | Age: 68
End: 2023-10-15

## 2023-10-15 RX ORDER — PANTOPRAZOLE SODIUM 40 MG/1
MIX 1 PACKET WITH APPLE JUICE OR APPLESAUCE EVERY DAY GRANULE, DELAYED RELEASE ORAL
Qty: 30 EACH | Refills: 11 | OUTPATIENT

## 2023-10-15 RX ORDER — PANTOPRAZOLE SODIUM 40 MG/1
1 PACKET MIXED WITH APPLE JUICE OR APPLESAUCE GRANULE, DELAYED RELEASE ORAL ONCE A DAY
Qty: 30 | Refills: 6 | OUTPATIENT

## 2023-11-08 ENCOUNTER — OFFICE VISIT (OUTPATIENT)
Dept: RURAL CLINIC 8 | Facility: CLINIC | Age: 68
End: 2023-11-08

## 2023-11-08 RX ORDER — FAMOTIDINE 20 MG/1
1 TABLET AT BEDTIME AS NEEDED TABLET, FILM COATED ORAL ONCE A DAY
COMMUNITY

## 2023-11-08 RX ORDER — LOSARTAN POTASSIUM 50 MG/1
1 TABLET TABLET ORAL ONCE A DAY
COMMUNITY

## 2023-11-08 RX ORDER — BUPRENORPHINE HYDROCHLORIDE, NALOXONE HYDROCHLORIDE 8; 2 MG/1; MG/1
1 FILM UNDER THE TONGUE AND ALLOW TO DISSOLVE FILM, SOLUBLE BUCCAL; SUBLINGUAL ONCE A DAY
COMMUNITY

## 2023-11-08 RX ORDER — ALBUTEROL SULFATE 90 UG/1
1 PUFF AS NEEDED AEROSOL, METERED RESPIRATORY (INHALATION)
COMMUNITY

## 2023-11-08 RX ORDER — BUDESONIDE AND FORMOTEROL FUMARATE DIHYDRATE 160; 4.5 UG/1; UG/1
2 PUFFS AEROSOL RESPIRATORY (INHALATION) TWICE A DAY
COMMUNITY

## 2023-11-08 RX ORDER — PANTOPRAZOLE SODIUM 40 MG/1
MIX 1 PACKET WITH APPLE JUICE OR APPLESAUCE EVERY DAY GRANULE, DELAYED RELEASE ORAL
Qty: 30 EACH | Refills: 11 | COMMUNITY

## 2023-11-08 NOTE — HPI-TODAY'S VISIT:
Pt comes on referral from Dr. Lakesha Escalona. a copy will be sent to the referring MD. Pt says that this problem started about 5 years ago. she started to have problems swallowing. food will just go part of the way down and hang up. she would have to wash it down and keep on eating. it can hang up and stay there all day long. - it feels like liquids go down just fine but solid foods hang up. it feels like it get stuck about midways down.she will have to jjst drink water til it all goes down. - she says that she has lost 65 pounds in the last year. she thinks its b/c she cannot eat enough. she apparently had a swallow evatuaion radiographically but of course it was not sent to me to review. -   06/22/2022: She was on the schedule for Repeat colonoscopy yesterday but she did not come to the appointment.  Recent CT scan showed some chronic thickening of the sigmoid colon. -   In February 2022 I performed this patient's EGD secondary to dysphagia or and I empirically dilated the esophagus.  Antral biopsies were negative for Helicobacter.  The colonoscopy was aborted secondary to formed stool in the colon. -  she is only on famotidine once a day and still having acid reflux.  She says that she get spontaneous regurgitation of acid.  She also says that sometimes food does not want to go down all the way.  She has only been able to eat liquid things.  She was unable to keep the prep down in order to have her colonoscopy performed. -  10/12/2022: At her last visit I requested an upper GI series that did show acid reflux.  There was slight delay in the contrast passing through the small bowel.  Patient could not hold down the prep to re-attempt her colonoscopy after the 1 in February had to be aborted due to her poor bowel prep.  We also gave her some Levsin last time to try.  She says that she is having bad acid reflux and she is bed constipated.  Takes Suboxone 3 times a day.  She is having lower cramping pain in the pelvis and lower abdomen. 01/12/2023 The patient reports worsening acid reflux since stopping Nexium due to side effects.  Lower abdominal cramping and constipation has worsened and resulting in abdominal bloating.  She was seen at [facility] emergency room approximately on December 18, 2022 and was given dicyclomine which is helping.  A CT scan of the abdomen and pelvis was completed showing mild colonic diverticular reticulosis without evidence of diverticulitis was seen.  Mild intrahepatic biliary ductal dilation was noted as well as postcholecystectomy changes.  No acute abdominal or pelvic pathology was noted. -   11/08/2023: Patient was last seen in January 2023 by NP.  The patient apparently been to the emergency room at [facility] for abdominal pain.  CT scan showed nothing acute apparently.  She was prescribed Movantik to help with her chronic narcotic induced constipation.

## 2023-11-27 ENCOUNTER — TELEPHONE ENCOUNTER (OUTPATIENT)
Dept: RURAL CLINIC 8 | Facility: CLINIC | Age: 68
End: 2023-11-27

## 2023-11-29 ENCOUNTER — OFFICE VISIT (OUTPATIENT)
Dept: RURAL CLINIC 8 | Facility: CLINIC | Age: 68
End: 2023-11-29

## 2023-11-29 RX ORDER — BUDESONIDE AND FORMOTEROL FUMARATE DIHYDRATE 160; 4.5 UG/1; UG/1
2 PUFFS AEROSOL RESPIRATORY (INHALATION) TWICE A DAY
Status: ACTIVE | COMMUNITY

## 2023-11-29 RX ORDER — ALBUTEROL SULFATE 90 UG/1
1 PUFF AS NEEDED AEROSOL, METERED RESPIRATORY (INHALATION)
Status: ACTIVE | COMMUNITY

## 2023-11-29 RX ORDER — FAMOTIDINE 20 MG/1
1 TABLET AT BEDTIME AS NEEDED TABLET, FILM COATED ORAL ONCE A DAY
Status: ACTIVE | COMMUNITY

## 2023-11-29 RX ORDER — LOSARTAN POTASSIUM 50 MG/1
1 TABLET TABLET ORAL ONCE A DAY
Status: ACTIVE | COMMUNITY

## 2023-11-29 RX ORDER — BUPRENORPHINE HYDROCHLORIDE, NALOXONE HYDROCHLORIDE 8; 2 MG/1; MG/1
1 FILM UNDER THE TONGUE AND ALLOW TO DISSOLVE FILM, SOLUBLE BUCCAL; SUBLINGUAL ONCE A DAY
Status: ACTIVE | COMMUNITY

## 2023-11-29 RX ORDER — PANTOPRAZOLE SODIUM 40 MG/1
MIX 1 PACKET WITH APPLE JUICE OR APPLESAUCE EVERY DAY GRANULE, DELAYED RELEASE ORAL
Qty: 30 EACH | Refills: 11 | Status: ACTIVE | COMMUNITY

## 2023-11-29 NOTE — HPI-TODAY'S VISIT:
Pt comes on referral from Dr. Lakesha Escalona. a copy will be sent to the referring MD. Pt says that this problem started about 5 years ago. she started to have problems swallowing. food will just go part of the way down and hang up. she would have to wash it down and keep on eating. it can hang up and stay there all day long. - it feels like liquids go down just fine but solid foods hang up. it feels like it get stuck about midways down.she will have to jjst drink water til it all goes down. - she says that she has lost 65 pounds in the last year. she thinks its b/c she cannot eat enough. she apparently had a swallow evatuaion radiographically but of course it was not sent to me to review. -   06/22/2022: She was on the schedule for Repeat colonoscopy yesterday but she did not come to the appointment.  Recent CT scan showed some chronic thickening of the sigmoid colon. -   In February 2022 I performed this patient's EGD secondary to dysphagia or and I empirically dilated the esophagus.  Antral biopsies were negative for Helicobacter.  The colonoscopy was aborted secondary to formed stool in the colon. -  she is only on famotidine once a day and still having acid reflux.  She says that she get spontaneous regurgitation of acid.  She also says that sometimes food does not want to go down all the way.  She has only been able to eat liquid things.  She was unable to keep the prep down in order to have her colonoscopy performed. -  10/12/2022: At her last visit I requested an upper GI series that did show acid reflux.  There was slight delay in the contrast passing through the small bowel.  Patient could not hold down the prep to re-attempt her colonoscopy after the 1 in February had to be aborted due to her poor bowel prep.  We also gave her some Levsin last time to try.  She says that she is having bad acid reflux and she is bed constipated.  Takes Suboxone 3 times a day.  She is having lower cramping pain in the pelvis and lower abdomen. 01/12/2023 The patient reports worsening acid reflux since stopping Nexium due to side effects.  Lower abdominal cramping and constipation has worsened and resulting in abdominal bloating.  She was seen at [facility] emergency room approximately on December 18, 2022 and was given dicyclomine which is helping.  A CT scan of the abdomen and pelvis was completed showing mild colonic diverticular reticulosis without evidence of diverticulitis was seen.  Mild intrahepatic biliary ductal dilation was noted as well as postcholecystectomy changes.  No acute abdominal or pelvic pathology was noted. -   11/08/2023: Patient was last seen in January 2023 by NP.  The patient apparently been to the emergency room at [facility] for abdominal pain.  CT scan showed nothing acute apparently.  She was prescribed Movantik to help with her chronic narcotic induced constipation.

## 2023-12-07 ENCOUNTER — OFFICE VISIT (OUTPATIENT)
Dept: RURAL CLINIC 4 | Facility: CLINIC | Age: 68
End: 2023-12-07

## 2023-12-07 RX ORDER — PANTOPRAZOLE SODIUM 40 MG/1
MIX 1 PACKET WITH APPLE JUICE OR APPLESAUCE EVERY DAY GRANULE, DELAYED RELEASE ORAL
Qty: 30 EACH | Refills: 11 | COMMUNITY

## 2023-12-07 RX ORDER — LOSARTAN POTASSIUM 50 MG/1
1 TABLET TABLET ORAL ONCE A DAY
COMMUNITY

## 2023-12-07 RX ORDER — FAMOTIDINE 20 MG/1
1 TABLET AT BEDTIME AS NEEDED TABLET, FILM COATED ORAL ONCE A DAY
COMMUNITY

## 2023-12-07 RX ORDER — ALBUTEROL SULFATE 90 UG/1
1 PUFF AS NEEDED AEROSOL, METERED RESPIRATORY (INHALATION)
COMMUNITY

## 2023-12-07 RX ORDER — BUDESONIDE AND FORMOTEROL FUMARATE DIHYDRATE 160; 4.5 UG/1; UG/1
2 PUFFS AEROSOL RESPIRATORY (INHALATION) TWICE A DAY
COMMUNITY

## 2023-12-07 RX ORDER — BUPRENORPHINE HYDROCHLORIDE, NALOXONE HYDROCHLORIDE 8; 2 MG/1; MG/1
1 FILM UNDER THE TONGUE AND ALLOW TO DISSOLVE FILM, SOLUBLE BUCCAL; SUBLINGUAL ONCE A DAY
COMMUNITY

## 2023-12-20 NOTE — PHYSICAL EXAM GASTROINTESTINAL
Abdomen , soft, nontender, nondistended , no guarding or rigidity , no masses palpable , normal bowel sounds , Liver and Spleen , no hepatomegaly present , no hepatosplenomegaly , liver nontender , spleen not palpable VASCULAR CARDIOLOGY ATTENDING PROGRESS NOTE    SERVICE LINE: [phone]    Subjective:    No acute events overnight.   ROS negative.     Current Medications:   acetaminophen     Tablet .. 975 milliGRAM(s) Oral <User Schedule>  azithromycin  IVPB 500 milliGRAM(s) IV Intermittent once  chlorhexidine 2% Cloths 1 Application(s) Topical daily  diphenhydrAMINE 25 milliGRAM(s) Oral every 6 hours PRN  diphtheria/tetanus/pertussis (acellular) Vaccine (Adacel) 0.5 milliLiter(s) IntraMuscular once  enoxaparin Injectable 40 milliGRAM(s) SubCutaneous every 24 hours  fentanyl (2 MICROgram(s)/mL) + bupivacaine 0.0625%  in 0.9% Sodium Chloride PCEA 250 milliLiter(s) Epidural PCA Continuous  ibuprofen  Tablet. 600 milliGRAM(s) Oral every 6 hours  ketorolac   Injectable 30 milliGRAM(s) IV Push every 6 hours  lactated ringers. 1000 milliLiter(s) IV Continuous <Continuous>  lanolin Ointment 1 Application(s) Topical every 6 hours PRN  magnesium hydroxide Suspension 30 milliLiter(s) Oral two times a day PRN  metoprolol succinate ER 25 milliGRAM(s) Oral every 24 hours  ondansetron Injectable 8 milliGRAM(s) IV Push every 8 hours PRN  oxyCODONE    IR 5 milliGRAM(s) Oral once PRN  oxyCODONE    IR 5 milliGRAM(s) Oral every 3 hours PRN  oxytocin Infusion 333.333 milliUNIT(s)/Min IV Continuous <Continuous>  simethicone 80 milliGRAM(s) Chew every 4 hours PRN      REVIEW OF SYSTEMS:  CONSTITUTIONAL: No weakness, fevers or chills  EYES/ENT: No visual changes;  No dysphagia  NECK: No pain or stiffness  RESPIRATORY: No cough, wheezing, hemoptysis; No shortness of breath  CARDIOVASCULAR: No chest pain or palpitations; No lower extremity edema  GASTROINTESTINAL: No abdominal or epigastric pain. No nausea, vomiting, or hematemesis; No diarrhea or constipation. No melena or hematochezia.  BACK: No back pain  GENITOURINARY: No dysuria, frequency or hematuria  NEUROLOGICAL: No numbness or weakness  SKIN: No itching, burning, rashes, or lesions   All other review of systems is negative unless indicated above.    Physical Exam:  T(F): 97.5 (12-20), Max: 98.8 (12-19)  HR: 76 (12-20) (76 - 112)  BP: 107/67 (12-20) (99/53 - 122/60)  BP(mean): --  ABP: --  ABP(mean): --  RR: 18 (12-20)  SpO2: 100% (12-20)  GENERAL: No acute distress, well-developed  HEAD:  Atraumatic, Normocephalic  ENT: EOMI, PERRLA, conjunctiva and sclera clear, Neck supple, No JVD, moist mucosa  CHEST/LUNG: Clear to auscultation bilaterally; No wheeze, equal breath sounds bilaterally   BACK: No spinal tenderness  HEART: Regular rate and rhythm; No murmurs, rubs, or gallops  ABDOMEN: Soft, Nontender, Nondistended; Bowel sounds present  EXTREMITIES:  No clubbing, cyanosis, or edema  PSYCH: Nl behavior, nl affect  NEUROLOGY: AAOx3, non-focal, cranial nerves intact  SKIN: Normal color, No rashes or lesions  LINES:    Cardiovascular Diagnostic Testing: personally reviewed    CXR: Personally reviewed    Labs: Personally reviewed                        11.6   15.87 )-----------( 197      ( 20 Dec 2023 12:00 )             34.5                              VASCULAR CARDIOLOGY ATTENDING PROGRESS NOTE    SERVICE LINE: [phone]    Subjective:    No acute events overnight.   ROS negative.     Current Medications:   acetaminophen     Tablet .. 975 milliGRAM(s) Oral <User Schedule>  azithromycin  IVPB 500 milliGRAM(s) IV Intermittent once  chlorhexidine 2% Cloths 1 Application(s) Topical daily  diphenhydrAMINE 25 milliGRAM(s) Oral every 6 hours PRN  diphtheria/tetanus/pertussis (acellular) Vaccine (Adacel) 0.5 milliLiter(s) IntraMuscular once  enoxaparin Injectable 40 milliGRAM(s) SubCutaneous every 24 hours  fentanyl (2 MICROgram(s)/mL) + bupivacaine 0.0625%  in 0.9% Sodium Chloride PCEA 250 milliLiter(s) Epidural PCA Continuous  ibuprofen  Tablet. 600 milliGRAM(s) Oral every 6 hours  ketorolac   Injectable 30 milliGRAM(s) IV Push every 6 hours  lactated ringers. 1000 milliLiter(s) IV Continuous <Continuous>  lanolin Ointment 1 Application(s) Topical every 6 hours PRN  magnesium hydroxide Suspension 30 milliLiter(s) Oral two times a day PRN  metoprolol succinate ER 25 milliGRAM(s) Oral every 24 hours  ondansetron Injectable 8 milliGRAM(s) IV Push every 8 hours PRN  oxyCODONE    IR 5 milliGRAM(s) Oral once PRN  oxyCODONE    IR 5 milliGRAM(s) Oral every 3 hours PRN  oxytocin Infusion 333.333 milliUNIT(s)/Min IV Continuous <Continuous>  simethicone 80 milliGRAM(s) Chew every 4 hours PRN      REVIEW OF SYSTEMS:  CONSTITUTIONAL: No weakness, fevers or chills  EYES/ENT: No visual changes;  No dysphagia  NECK: No pain or stiffness  RESPIRATORY: No cough, wheezing, hemoptysis; No shortness of breath  CARDIOVASCULAR: No chest pain or palpitations; No lower extremity edema  GASTROINTESTINAL: No abdominal or epigastric pain. No nausea, vomiting, or hematemesis; No diarrhea or constipation. No melena or hematochezia.  BACK: No back pain  GENITOURINARY: No dysuria, frequency or hematuria  NEUROLOGICAL: No numbness or weakness  SKIN: No itching, burning, rashes, or lesions   All other review of systems is negative unless indicated above.    Physical Exam:  T(F): 97.5 (12-20), Max: 98.8 (12-19)  HR: 76 (12-20) (76 - 112)  BP: 107/67 (12-20) (99/53 - 122/60)  BP(mean): --  ABP: --  ABP(mean): --  RR: 18 (12-20)  SpO2: 100% (12-20)  GENERAL: No acute distress, well-developed  HEAD:  Atraumatic, Normocephalic  ENT: EOMI, PERRLA, conjunctiva and sclera clear, Neck supple, No JVD, moist mucosa  CHEST/LUNG: Clear to auscultation bilaterally; No wheeze, equal breath sounds bilaterally   BACK: No spinal tenderness  HEART: Regular rate and rhythm; No murmurs, rubs, or gallops  ABDOMEN: Soft, Nontender, Nondistended; Bowel sounds present  EXTREMITIES:  No clubbing, cyanosis, or edema  PSYCH: Nl behavior, nl affect  NEUROLOGY: AAOx3, non-focal, cranial nerves intact  SKIN: Normal color, No rashes or lesions  LINES:    Cardiovascular Diagnostic Testing: personally reviewed    CXR: Personally reviewed    Labs: Personally reviewed                        11.6   15.87 )-----------( 197      ( 20 Dec 2023 12:00 )             34.5

## 2024-01-04 ENCOUNTER — OFFICE VISIT (OUTPATIENT)
Dept: RURAL CLINIC 4 | Facility: CLINIC | Age: 69
End: 2024-01-04

## 2024-01-04 RX ORDER — LOSARTAN POTASSIUM 50 MG/1
1 TABLET TABLET ORAL ONCE A DAY
COMMUNITY

## 2024-01-04 RX ORDER — ALBUTEROL SULFATE 90 UG/1
1 PUFF AS NEEDED AEROSOL, METERED RESPIRATORY (INHALATION)
COMMUNITY

## 2024-01-04 RX ORDER — BUDESONIDE AND FORMOTEROL FUMARATE DIHYDRATE 160; 4.5 UG/1; UG/1
2 PUFFS AEROSOL RESPIRATORY (INHALATION) TWICE A DAY
COMMUNITY

## 2024-01-04 RX ORDER — FAMOTIDINE 20 MG/1
1 TABLET AT BEDTIME AS NEEDED TABLET, FILM COATED ORAL ONCE A DAY
COMMUNITY

## 2024-01-04 RX ORDER — BUPRENORPHINE HYDROCHLORIDE, NALOXONE HYDROCHLORIDE 8; 2 MG/1; MG/1
1 FILM UNDER THE TONGUE AND ALLOW TO DISSOLVE FILM, SOLUBLE BUCCAL; SUBLINGUAL ONCE A DAY
COMMUNITY

## 2024-01-04 RX ORDER — PANTOPRAZOLE SODIUM 40 MG/1
MIX 1 PACKET WITH APPLE JUICE OR APPLESAUCE EVERY DAY GRANULE, DELAYED RELEASE ORAL
Qty: 30 EACH | Refills: 11 | COMMUNITY

## 2024-01-18 ENCOUNTER — OFFICE VISIT (OUTPATIENT)
Dept: RURAL CLINIC 4 | Facility: CLINIC | Age: 69
End: 2024-01-18

## 2024-01-18 RX ORDER — LOSARTAN POTASSIUM 50 MG/1
1 TABLET TABLET ORAL ONCE A DAY
COMMUNITY

## 2024-01-18 RX ORDER — BUDESONIDE AND FORMOTEROL FUMARATE DIHYDRATE 160; 4.5 UG/1; UG/1
2 PUFFS AEROSOL RESPIRATORY (INHALATION) TWICE A DAY
COMMUNITY

## 2024-01-18 RX ORDER — ALBUTEROL SULFATE 90 UG/1
1 PUFF AS NEEDED AEROSOL, METERED RESPIRATORY (INHALATION)
COMMUNITY

## 2024-01-18 RX ORDER — PANTOPRAZOLE SODIUM 40 MG/1
MIX 1 PACKET WITH APPLE JUICE OR APPLESAUCE EVERY DAY GRANULE, DELAYED RELEASE ORAL
Qty: 30 EACH | Refills: 11 | COMMUNITY

## 2024-01-18 RX ORDER — FAMOTIDINE 20 MG/1
1 TABLET AT BEDTIME AS NEEDED TABLET, FILM COATED ORAL ONCE A DAY
COMMUNITY

## 2024-01-18 RX ORDER — BUPRENORPHINE HYDROCHLORIDE, NALOXONE HYDROCHLORIDE 8; 2 MG/1; MG/1
1 FILM UNDER THE TONGUE AND ALLOW TO DISSOLVE FILM, SOLUBLE BUCCAL; SUBLINGUAL ONCE A DAY
COMMUNITY

## 2024-01-18 NOTE — HPI-TODAY'S VISIT:
Pt comes on referral from Dr. Lakesha Escalona. a copy will be sent to the referring MD. Pt says that this problem started about 5 years ago. she started to have problems swallowing. food will just go part of the way down and hang up. she would have to wash it down and keep on eating. it can hang up and stay there all day long. - it feels like liquids go down just fine but solid foods hang up. it feels like it get stuck about midways down.she will have to jjst drink water til it all goes down. - she says that she has lost 65 pounds in the last year. she thinks its b/c she cannot eat enough. she apparently had a swallow evatuaion radiographically but of course it was not sent to me to review. -   06/22/2022: She was on the schedule for Repeat colonoscopy yesterday but she did not come to the appointment.  Recent CT scan showed some chronic thickening of the sigmoid colon. -   In February 2022 I performed this patient's EGD secondary to dysphagia or and I empirically dilated the esophagus.  Antral biopsies were negative for Helicobacter.  The colonoscopy was aborted secondary to formed stool in the colon. -  she is only on famotidine once a day and still having acid reflux.  She says that she get spontaneous regurgitation of acid.  She also says that sometimes food does not want to go down all the way.  She has only been able to eat liquid things.  She was unable to keep the prep down in order to have her colonoscopy performed. -  10/12/2022: At her last visit I requested an upper GI series that did show acid reflux.  There was slight delay in the contrast passing through the small bowel.  Patient could not hold down the prep to re-attempt her colonoscopy after the 1 in February had to be aborted due to her poor bowel prep.  We also gave her some Levsin last time to try.  She says that she is having bad acid reflux and she is bed constipated.  Takes Suboxone 3 times a day.  She is having lower cramping pain in the pelvis and lower abdomen. 01/12/2023 The patient reports worsening acid reflux since stopping Nexium due to side effects.  Lower abdominal cramping and constipation has worsened and resulting in abdominal bloating.  She was seen at [facility] emergency room approximately on December 18, 2022 and was given dicyclomine which is helping.  A CT scan of the abdomen and pelvis was completed showing mild colonic diverticular reticulosis without evidence of diverticulitis was seen.  Mild intrahepatic biliary ductal dilation was noted as well as postcholecystectomy changes.  No acute abdominal or pelvic pathology was noted. -   11/08/2023: Patient was last seen in January 2023 by NP.  The patient apparently been to the emergency room at [facility] for abdominal pain.  CT scan showed nothing acute apparently.  She was prescribed Movantik to help with her chronic narcotic induced constipation.

## 2024-01-25 ENCOUNTER — DASHBOARD ENCOUNTERS (OUTPATIENT)
Age: 69
End: 2024-01-25

## 2024-01-25 ENCOUNTER — LAB OUTSIDE AN ENCOUNTER (OUTPATIENT)
Dept: RURAL CLINIC 4 | Facility: CLINIC | Age: 69
End: 2024-01-25

## 2024-01-25 ENCOUNTER — TELEPHONE ENCOUNTER (OUTPATIENT)
Dept: RURAL CLINIC 2 | Facility: CLINIC | Age: 69
End: 2024-01-25

## 2024-01-25 ENCOUNTER — OFFICE VISIT (OUTPATIENT)
Dept: RURAL CLINIC 4 | Facility: CLINIC | Age: 69
End: 2024-01-25
Payer: COMMERCIAL

## 2024-01-25 VITALS
DIASTOLIC BLOOD PRESSURE: 69 MMHG | WEIGHT: 155 LBS | TEMPERATURE: 98 F | HEIGHT: 62 IN | BODY MASS INDEX: 28.52 KG/M2 | HEART RATE: 77 BPM | SYSTOLIC BLOOD PRESSURE: 164 MMHG

## 2024-01-25 DIAGNOSIS — R10.84 GENERALIZED ABDOMINAL PAIN: ICD-10-CM

## 2024-01-25 DIAGNOSIS — K59.03 DRUG-INDUCED CONSTIPATION: ICD-10-CM

## 2024-01-25 DIAGNOSIS — R13.19 ESOPHAGEAL DYSPHAGIA: ICD-10-CM

## 2024-01-25 DIAGNOSIS — K21.9 GASTROESOPHAGEAL REFLUX DISEASE WITHOUT ESOPHAGITIS: ICD-10-CM

## 2024-01-25 PROBLEM — 266435005: Status: ACTIVE | Noted: 2024-01-25

## 2024-01-25 PROBLEM — 428283002: Status: ACTIVE | Noted: 2024-01-25

## 2024-01-25 PROBLEM — 21782001: Status: ACTIVE | Noted: 2024-01-25

## 2024-01-25 PROBLEM — 102614006: Status: ACTIVE | Noted: 2024-01-25

## 2024-01-25 PROBLEM — 40890009: Status: ACTIVE | Noted: 2024-01-25

## 2024-01-25 PROCEDURE — 99214 OFFICE O/P EST MOD 30 MIN: CPT | Performed by: NURSE PRACTITIONER

## 2024-01-25 RX ORDER — ALBUTEROL SULFATE 90 UG/1
1 PUFF AS NEEDED AEROSOL, METERED RESPIRATORY (INHALATION)
Status: ACTIVE | COMMUNITY

## 2024-01-25 RX ORDER — DICYCLOMINE HYDROCHLORIDE 10 MG/1
2 CAPSULES CAPSULE ORAL THREE TIMES A DAY
Status: ACTIVE | COMMUNITY

## 2024-01-25 RX ORDER — PANTOPRAZOLE SODIUM 40 MG/1
MIX 1 PACKET WITH APPLE JUICE OR APPLESAUCE EVERY DAY GRANULE, DELAYED RELEASE ORAL
Qty: 30 EACH | Refills: 11 | Status: DISCONTINUED | COMMUNITY

## 2024-01-25 RX ORDER — BUPRENORPHINE HYDROCHLORIDE, NALOXONE HYDROCHLORIDE 8; 2 MG/1; MG/1
1 FILM UNDER THE TONGUE AND ALLOW TO DISSOLVE FILM, SOLUBLE BUCCAL; SUBLINGUAL ONCE A DAY
Status: ACTIVE | COMMUNITY

## 2024-01-25 RX ORDER — LOSARTAN POTASSIUM 50 MG/1
1 TABLET TABLET ORAL ONCE A DAY
Status: ACTIVE | COMMUNITY

## 2024-01-25 RX ORDER — FAMOTIDINE 40 MG/5ML
5 ML POWDER, FOR SUSPENSION ORAL
Qty: 300 ML | Refills: 11 | OUTPATIENT
Start: 2024-01-25

## 2024-01-25 RX ORDER — BUDESONIDE AND FORMOTEROL FUMARATE DIHYDRATE 160; 4.5 UG/1; UG/1
2 PUFFS AEROSOL RESPIRATORY (INHALATION) TWICE A DAY
Status: ACTIVE | COMMUNITY

## 2024-01-25 RX ORDER — FAMOTIDINE 20 MG/1
1 TABLET AT BEDTIME AS NEEDED TABLET, FILM COATED ORAL ONCE A DAY
Status: ACTIVE | COMMUNITY

## 2024-01-25 NOTE — PHYSICAL EXAM EYES:
Conjuntivae and eyelids appear normal, Sclerae : White without injection NSTEMI (non-ST elevated myocardial infarction)

## 2024-01-25 NOTE — HPI-ZZZTODAY'S VISIT
01/25/2024 The patient is here with multiple complaints to include chronic constipation and is currently taking MiraLAX 1-3 times per day and is having trouble with the appropriate dosage. She was prescribed Movantik on her previous visit and due to abdominal cramping she discontinued the medication.  She is having chronic difficulty swallowing and is pointing to the right side of her throat and has not been able to take her pantoprazole due to sticking on the way down. She is not able to swallow solid food and remains on a full liquid diet.   She is currently taking famotidine 20 mg liquid form and is having breakthrough abdominal pain and reflux.  She underwent an upper endoscopy in February 2022 for esophageal dysphagia with findings of slight resistance to intubating the upper esophageal sphincter.  Otherwise, normal esophagus.  The esophagus was dilated to 18 mm with dilation balloon at the GE junction.  The patient also underwent a colonoscopy at the same time and was aborted at the descending colon due to solid stool.  She attempted to repeat a colonoscopy and was unable to tolerate the prep.

## 2024-01-25 NOTE — HPI-TODAY'S VISIT:
Pt comes on referral from Dr. Lakesha Escalona. a copy will be sent to the referring MD. Pt says that this problem started about 5 years ago. she started to have problems swallowing. food will just go part of the way down and hang up. she would have to wash it down and keep on eating. it can hang up and stay there all day long. - it feels like liquids go down just fine but solid foods hang up. it feels like it get stuck about midways down.she will have to jjst drink water til it all goes down. - she says that she has lost 65 pounds in the last year. she thinks its b/c she cannot eat enough. she apparently had a swallow evatuaion radiographically but of course it was not sent to me to review. -   06/22/2022: She was on the schedule for Repeat colonoscopy yesterday but she did not come to the appointment.  Recent CT scan showed some chronic thickening of the sigmoid colon. -   In February 2022 I performed this patient's EGD secondary to dysphagia or and I empirically dilated the esophagus.  Antral biopsies were negative for Helicobacter.  The colonoscopy was aborted secondary to formed stool in the colon. -  she is only on famotidine once a day and still having acid reflux.  She says that she get spontaneous regurgitation of acid.  She also says that sometimes food does not want to go down all the way.  She has only been able to eat liquid things.  She was unable to keep the prep down in order to have her colonoscopy performed. -  10/12/2022: At her last visit I requested an upper GI series that did show acid reflux.  There was slight delay in the contrast passing through the small bowel.  Patient could not hold down the prep to re-attempt her colonoscopy after the 1 in February had to be aborted due to her poor bowel prep.  We also gave her some Levsin last time to try.  She says that she is having bad acid reflux and she is bed constipated.  Takes Suboxone 3 times a day.  She is having lower cramping pain in the pelvis and lower abdomen. 01/12/2023 The patient reports worsening acid reflux since stopping Nexium due to side effects.  Lower abdominal cramping and constipation has worsened and resulting in abdominal bloating.  She was seen at [facility] emergency room approximately on December 18, 2022 and was given dicyclomine which is helping.  A CT scan of the abdomen and pelvis was completed showing mild colonic diverticular reticulosis without evidence of diverticulitis was seen.  Mild intrahepatic biliary ductal dilation was noted as well as postcholecystectomy changes.  No acute abdominal or pelvic pathology was noted. -   11/08/2023: Patient was last seen in January 2023 by NP.  The patient apparently been to the emergency room at [facility] for abdominal pain.  CT scan showed nothing acute apparently.  She was prescribed Movantik to help with her chronic narcotic induced constipation.

## 2024-08-01 ENCOUNTER — OFFICE VISIT (OUTPATIENT)
Dept: RURAL CLINIC 4 | Facility: CLINIC | Age: 69
End: 2024-08-01

## 2024-08-01 RX ORDER — BUDESONIDE AND FORMOTEROL FUMARATE DIHYDRATE 160; 4.5 UG/1; UG/1
2 PUFFS AEROSOL RESPIRATORY (INHALATION) TWICE A DAY
COMMUNITY

## 2024-08-01 RX ORDER — BUPRENORPHINE HYDROCHLORIDE, NALOXONE HYDROCHLORIDE 8; 2 MG/1; MG/1
1 FILM UNDER THE TONGUE AND ALLOW TO DISSOLVE FILM, SOLUBLE BUCCAL; SUBLINGUAL ONCE A DAY
COMMUNITY

## 2024-08-01 RX ORDER — LOSARTAN POTASSIUM 50 MG/1
1 TABLET TABLET ORAL ONCE A DAY
COMMUNITY

## 2024-08-01 RX ORDER — ALBUTEROL SULFATE 90 UG/1
1 PUFF AS NEEDED AEROSOL, METERED RESPIRATORY (INHALATION)
COMMUNITY

## 2024-08-01 RX ORDER — FAMOTIDINE 40 MG/5ML
5 ML POWDER, FOR SUSPENSION ORAL
Qty: 300 ML | Refills: 11 | COMMUNITY
Start: 2024-01-25

## 2024-08-01 RX ORDER — DICYCLOMINE HYDROCHLORIDE 10 MG/1
2 CAPSULES CAPSULE ORAL THREE TIMES A DAY
COMMUNITY

## 2024-08-01 RX ORDER — FAMOTIDINE 20 MG/1
1 TABLET AT BEDTIME AS NEEDED TABLET, FILM COATED ORAL ONCE A DAY
COMMUNITY

## 2024-08-15 ENCOUNTER — OFFICE VISIT (OUTPATIENT)
Dept: RURAL CLINIC 4 | Facility: CLINIC | Age: 69
End: 2024-08-15

## 2024-08-15 RX ORDER — LOSARTAN POTASSIUM 50 MG/1
1 TABLET TABLET ORAL ONCE A DAY
COMMUNITY

## 2024-08-15 RX ORDER — FAMOTIDINE 40 MG/5ML
5 ML POWDER, FOR SUSPENSION ORAL
Qty: 300 ML | Refills: 11 | COMMUNITY
Start: 2024-01-25

## 2024-08-15 RX ORDER — FAMOTIDINE 20 MG/1
1 TABLET AT BEDTIME AS NEEDED TABLET, FILM COATED ORAL ONCE A DAY
COMMUNITY

## 2024-08-15 RX ORDER — BUPRENORPHINE HYDROCHLORIDE, NALOXONE HYDROCHLORIDE 8; 2 MG/1; MG/1
1 FILM UNDER THE TONGUE AND ALLOW TO DISSOLVE FILM, SOLUBLE BUCCAL; SUBLINGUAL ONCE A DAY
COMMUNITY

## 2024-08-15 RX ORDER — ALBUTEROL SULFATE 90 UG/1
1 PUFF AS NEEDED AEROSOL, METERED RESPIRATORY (INHALATION)
COMMUNITY

## 2024-08-15 RX ORDER — BUDESONIDE AND FORMOTEROL FUMARATE DIHYDRATE 160; 4.5 UG/1; UG/1
2 PUFFS AEROSOL RESPIRATORY (INHALATION) TWICE A DAY
COMMUNITY

## 2024-08-15 RX ORDER — DICYCLOMINE HYDROCHLORIDE 10 MG/1
2 CAPSULES CAPSULE ORAL THREE TIMES A DAY
COMMUNITY

## 2024-09-12 ENCOUNTER — OFFICE VISIT (OUTPATIENT)
Dept: RURAL CLINIC 4 | Facility: CLINIC | Age: 69
End: 2024-09-12

## 2024-09-12 RX ORDER — BUDESONIDE AND FORMOTEROL FUMARATE DIHYDRATE 160; 4.5 UG/1; UG/1
2 PUFFS AEROSOL RESPIRATORY (INHALATION) TWICE A DAY
COMMUNITY

## 2024-09-12 RX ORDER — ALBUTEROL SULFATE 90 UG/1
1 PUFF AS NEEDED AEROSOL, METERED RESPIRATORY (INHALATION)
COMMUNITY

## 2024-09-12 RX ORDER — LOSARTAN POTASSIUM 50 MG/1
1 TABLET TABLET ORAL ONCE A DAY
COMMUNITY

## 2024-09-12 RX ORDER — BUPRENORPHINE HYDROCHLORIDE, NALOXONE HYDROCHLORIDE 8; 2 MG/1; MG/1
1 FILM UNDER THE TONGUE AND ALLOW TO DISSOLVE FILM, SOLUBLE BUCCAL; SUBLINGUAL ONCE A DAY
COMMUNITY

## 2024-09-12 RX ORDER — DICYCLOMINE HYDROCHLORIDE 10 MG/1
2 CAPSULES CAPSULE ORAL THREE TIMES A DAY
COMMUNITY

## 2024-09-12 RX ORDER — FAMOTIDINE 40 MG/5ML
5 ML POWDER, FOR SUSPENSION ORAL
Qty: 300 ML | Refills: 11 | COMMUNITY
Start: 2024-01-25

## 2024-09-12 RX ORDER — FAMOTIDINE 20 MG/1
1 TABLET AT BEDTIME AS NEEDED TABLET, FILM COATED ORAL ONCE A DAY
COMMUNITY

## 2024-10-03 ENCOUNTER — LAB OUTSIDE AN ENCOUNTER (OUTPATIENT)
Dept: RURAL CLINIC 4 | Facility: CLINIC | Age: 69
End: 2024-10-03

## 2024-10-03 ENCOUNTER — OFFICE VISIT (OUTPATIENT)
Dept: RURAL CLINIC 4 | Facility: CLINIC | Age: 69
End: 2024-10-03
Payer: COMMERCIAL

## 2024-10-03 VITALS
BODY MASS INDEX: 33.13 KG/M2 | WEIGHT: 180 LBS | DIASTOLIC BLOOD PRESSURE: 86 MMHG | HEIGHT: 62 IN | SYSTOLIC BLOOD PRESSURE: 169 MMHG | HEART RATE: 97 BPM | TEMPERATURE: 98 F

## 2024-10-03 DIAGNOSIS — K21.00 GASTROESOPHAGEAL REFLUX DISEASE WITH ESOPHAGITIS WITHOUT HEMORRHAGE: ICD-10-CM

## 2024-10-03 DIAGNOSIS — R13.19 ESOPHAGEAL DYSPHAGIA: ICD-10-CM

## 2024-10-03 DIAGNOSIS — Z86.0100 PERSONAL HISTORY OF COLONIC POLYPS: ICD-10-CM

## 2024-10-03 DIAGNOSIS — K59.04 CHRONIC IDIOPATHIC CONSTIPATION: ICD-10-CM

## 2024-10-03 PROBLEM — 428283002: Status: ACTIVE | Noted: 2024-10-03

## 2024-10-03 PROBLEM — 236069009: Status: ACTIVE | Noted: 2024-10-03

## 2024-10-03 PROCEDURE — 99214 OFFICE O/P EST MOD 30 MIN: CPT | Performed by: NURSE PRACTITIONER

## 2024-10-03 RX ORDER — BUPRENORPHINE HYDROCHLORIDE, NALOXONE HYDROCHLORIDE 8; 2 MG/1; MG/1
1 FILM UNDER THE TONGUE AND ALLOW TO DISSOLVE FILM, SOLUBLE BUCCAL; SUBLINGUAL ONCE A DAY
Status: ACTIVE | COMMUNITY

## 2024-10-03 RX ORDER — FAMOTIDINE 40 MG/5ML
5 ML POWDER, FOR SUSPENSION ORAL
Qty: 300 ML | Refills: 11 | Status: ACTIVE | COMMUNITY
Start: 2024-01-25

## 2024-10-03 RX ORDER — BUDESONIDE AND FORMOTEROL FUMARATE DIHYDRATE 160; 4.5 UG/1; UG/1
2 PUFFS AEROSOL RESPIRATORY (INHALATION) TWICE A DAY
Status: ACTIVE | COMMUNITY

## 2024-10-03 RX ORDER — FAMOTIDINE 20 MG/1
1 TABLET AT BEDTIME AS NEEDED TABLET, FILM COATED ORAL ONCE A DAY
Status: DISCONTINUED | COMMUNITY

## 2024-10-03 RX ORDER — DICYCLOMINE HYDROCHLORIDE 10 MG/1
2 CAPSULES CAPSULE ORAL THREE TIMES A DAY
Status: ACTIVE | COMMUNITY

## 2024-10-03 RX ORDER — LOSARTAN POTASSIUM 50 MG/1
1 TABLET TABLET ORAL ONCE A DAY
Status: ACTIVE | COMMUNITY

## 2024-10-03 RX ORDER — ALBUTEROL SULFATE 90 UG/1
1 PUFF AS NEEDED AEROSOL, METERED RESPIRATORY (INHALATION)
Status: ACTIVE | COMMUNITY

## 2024-10-03 NOTE — HPI-TODAY'S VISIT:
Pt comes on referral from Dr. Lakesha Escalona. a copy will be sent to the referring MD. Pt says that this problem started about 5 years ago. she started to have problems swallowing. food will just go part of the way down and hang up. she would have to wash it down and keep on eating. it can hang up and stay there all day long. - it feels like liquids go down just fine but solid foods hang up. it feels like it get stuck about midways down.she will have to jjst drink water til it all goes down. - she says that she has lost 65 pounds in the last year. she thinks its b/c she cannot eat enough. she apparently had a swallow evatuaion radiographically but of course it was not sent to me to review. -   06/22/2022: She was on the schedule for Repeat colonoscopy yesterday but she did not come to the appointment.  Recent CT scan showed some chronic thickening of the sigmoid colon. -   In February 2022 I performed this patient's EGD secondary to dysphagia or and I empirically dilated the esophagus.  Antral biopsies were negative for Helicobacter.  The colonoscopy was aborted secondary to formed stool in the colon. -  she is only on famotidine once a day and still having acid reflux.  She says that she get spontaneous regurgitation of acid.  She also says that sometimes food does not want to go down all the way.  She has only been able to eat liquid things.  She was unable to keep the prep down in order to have her colonoscopy performed. -  10/12/2022: At her last visit I requested an upper GI series that did show acid reflux.  There was slight delay in the contrast passing through the small bowel.  Patient could not hold down the prep to re-attempt her colonoscopy after the 1 in February had to be aborted due to her poor bowel prep.  We also gave her some Levsin last time to try.  She says that she is having bad acid reflux and she is bed constipated.  Takes Suboxone 3 times a day.  She is having lower cramping pain in the pelvis and lower abdomen. 01/12/2023 The patient reports worsening acid reflux since stopping Nexium due to side effects.  Lower abdominal cramping and constipation has worsened and resulting in abdominal bloating.  She was seen at [facility] emergency room approximately on December 18, 2022 and was given dicyclomine which is helping.  A CT scan of the abdomen and pelvis was completed showing mild colonic diverticular reticulosis without evidence of diverticulitis was seen.  Mild intrahepatic biliary ductal dilation was noted as well as postcholecystectomy changes.  No acute abdominal or pelvic pathology was noted. -   11/08/2023: Patient was last seen in January 2023 by NP.  The patient apparently been to the emergency room at [facility] [place] for abdominal pain.  CT scan showed nothing acute apparently.  She was prescribed Movantik to help with her chronic narcotic induced constipation.

## 2024-11-27 ENCOUNTER — OFFICE VISIT (OUTPATIENT)
Dept: RURAL MEDICAL CENTER 4 | Facility: MEDICAL CENTER | Age: 69
End: 2024-11-27